# Patient Record
Sex: FEMALE | URBAN - METROPOLITAN AREA
[De-identification: names, ages, dates, MRNs, and addresses within clinical notes are randomized per-mention and may not be internally consistent; named-entity substitution may affect disease eponyms.]

---

## 2021-01-11 ENCOUNTER — OFFICE VISIT (OUTPATIENT)
Dept: URBAN - METROPOLITAN AREA CLINIC 37 | Facility: CLINIC | Age: 51
End: 2021-01-11

## 2021-01-11 ENCOUNTER — LAB OUTSIDE AN ENCOUNTER (OUTPATIENT)
Dept: URBAN - METROPOLITAN AREA CLINIC 37 | Facility: CLINIC | Age: 51
End: 2021-01-11

## 2021-01-11 VITALS — HEIGHT: 67 IN | BODY MASS INDEX: 25.11 KG/M2 | WEIGHT: 160 LBS

## 2021-01-11 PROBLEM — 16331000: Status: ACTIVE | Noted: 2021-01-11

## 2021-01-11 PROBLEM — 235595009: Status: ACTIVE | Noted: 2021-01-11

## 2021-01-11 RX ORDER — TURMERIC/TURMERIC ROOT EXTRACT 450MG-50MG
AS DIRECTED CAPSULE ORAL
Status: ACTIVE | COMMUNITY

## 2021-01-11 RX ORDER — SACCHAROMYCES BOULARDII 250 MG
1 CAPSULE CAPSULE ORAL TWICE A DAY
Qty: 60 | Status: ACTIVE | COMMUNITY

## 2021-01-11 NOTE — HPI-MIGRATED HPI
Interim investigations : Labs done on: ->  * 12/14/2020, ordered by PCP: - CMP: Glu: 110 (H); BUN: 16; Cr: 0.77; Na: 141; K: 4.2; Alb: 4.5; ALT: 10; AST: 19; Tbili: 0.5 - CBC: WBC: 8.7; RBC: 4.56; Hgb: 12.0; Hct: 37.2; Plt: 398 - Fe, total: 65; Ferritin: 7 (L);   Initial consultation : Patient is here for -> Abdominal bloating Onset of symptoms: since childhood, on and off. Worsen in the past  10 years She has  limitted on dairy products which helped a bit She noticed that she has been worsen with gluten food as well Characteristics: burning, gas  She tried food elimination, probiotics She had been tested for Celiac disease by PCP and it was negative but she stated she had been on gluten free for so long during that test She was treated for IBS  but did not feel better at all She will be seen OB/GYN later this week to r/o pathology  She is here today to find out what is the cause of her long term symptoms Current BM patterns: BM daily but usually QOD Medications tried: none, in the past she tried Prevacid  Tests/evaluations done previously: Denies prior EGD/Colon;

## 2021-01-27 ENCOUNTER — TELEPHONE ENCOUNTER (OUTPATIENT)
Dept: URBAN - METROPOLITAN AREA CLINIC 35 | Facility: CLINIC | Age: 51
End: 2021-01-27

## 2021-02-05 ENCOUNTER — OFFICE VISIT (OUTPATIENT)
Dept: URBAN - METROPOLITAN AREA SURGERY CENTER 8 | Facility: SURGERY CENTER | Age: 51
End: 2021-02-05

## 2021-02-05 ENCOUNTER — TELEPHONE ENCOUNTER (OUTPATIENT)
Dept: URBAN - METROPOLITAN AREA CLINIC 35 | Facility: CLINIC | Age: 51
End: 2021-02-05

## 2021-02-05 RX ORDER — SACCHAROMYCES BOULARDII 250 MG
1 CAPSULE CAPSULE ORAL TWICE A DAY
Qty: 60 | Status: ACTIVE | COMMUNITY

## 2021-02-05 RX ORDER — TURMERIC/TURMERIC ROOT EXTRACT 450MG-50MG
AS DIRECTED CAPSULE ORAL
Status: ACTIVE | COMMUNITY

## 2021-02-05 RX ORDER — OMEPRAZOLE 40 MG/1
1 CAPSULE CAPSULE, DELAYED RELEASE ORAL
Qty: 30 | Refills: 2 | OUTPATIENT
Start: 2021-02-05

## 2021-02-19 ENCOUNTER — LAB OUTSIDE AN ENCOUNTER (OUTPATIENT)
Dept: URBAN - METROPOLITAN AREA CLINIC 35 | Facility: CLINIC | Age: 51
End: 2021-02-19

## 2021-02-19 ENCOUNTER — OFFICE VISIT (OUTPATIENT)
Dept: URBAN - METROPOLITAN AREA CLINIC 35 | Facility: CLINIC | Age: 51
End: 2021-02-19

## 2021-02-19 VITALS — HEIGHT: 67 IN | WEIGHT: 161 LBS | BODY MASS INDEX: 25.27 KG/M2

## 2021-02-19 RX ORDER — TURMERIC/TURMERIC ROOT EXTRACT 450MG-50MG
AS DIRECTED CAPSULE ORAL
Status: ACTIVE | COMMUNITY

## 2021-02-19 RX ORDER — SACCHAROMYCES BOULARDII 250 MG
1 CAPSULE CAPSULE ORAL TWICE A DAY
Qty: 60 | Status: ACTIVE | COMMUNITY

## 2021-02-19 RX ORDER — SODIUM SULFATE, POTASSIUM SULFATE, MAGNESIUM SULFATE 17.5; 3.13; 1.6 G/ML; G/ML; G/ML
AS DIRECTED SOLUTION, CONCENTRATE ORAL ONCE
Qty: 1 KIT | Refills: 0 | OUTPATIENT
Start: 2021-02-19

## 2021-02-19 RX ORDER — OMEPRAZOLE 40 MG/1
1 CAPSULE CAPSULE, DELAYED RELEASE ORAL
Qty: 30 | Refills: 2 | Status: ACTIVE | COMMUNITY
Start: 2021-02-05

## 2021-02-19 NOTE — HPI-MIGRATED HPI
Post-op OV : Patient -> denies any new changes in his/her health status since last OV;   Post-op OV : After EGD on -> 02/05/2021, done due to heartburn and abdominal bloating. The lower third of the esophagus was normal with bxx showing no significant histopathology, negative for IM/hyper eosinophilia. Non-bleeding gastric ulcers with pigmented material was found in the gastric body with bxx showing no significant histopathology. No evidence of H. pylori. Normal duodenum with bxx showing normal villous pattern, negative for increased intraepithelial lymphocytes. After the procedure patient was advised to take Omeprazole 40 mg daily;   Post-op OV : Patient reports of current symptoms -> improved.  She continues taking Benefiber and Prilosec as advised;   Post-op OV : Patient denies -> rectal bleeding, fever, nausea & vomiting since the procedure date;

## 2021-03-03 ENCOUNTER — OFFICE VISIT (OUTPATIENT)
Dept: URBAN - METROPOLITAN AREA SURGERY CENTER 8 | Facility: SURGERY CENTER | Age: 51
End: 2021-03-03

## 2021-03-23 ENCOUNTER — DASHBOARD ENCOUNTERS (OUTPATIENT)
Age: 51
End: 2021-03-23

## 2021-03-23 ENCOUNTER — OFFICE VISIT (OUTPATIENT)
Dept: URBAN - METROPOLITAN AREA CLINIC 35 | Facility: CLINIC | Age: 51
End: 2021-03-23

## 2021-03-23 VITALS — HEIGHT: 67 IN

## 2021-03-23 PROBLEM — 116289008: Status: ACTIVE | Noted: 2021-01-11

## 2021-03-23 PROBLEM — 87522002: Status: ACTIVE | Noted: 2021-02-19

## 2021-03-23 PROBLEM — 305058001: Status: ACTIVE | Noted: 2021-02-19

## 2021-03-23 PROBLEM — 59913009 GASTRIC ULCER WITHOUT HEMORRHAGE, WITHOUT PERFORATION AND WITHOUT OBSTRUCTION: Status: ACTIVE | Noted: 2021-02-05

## 2021-03-23 PROBLEM — 440630006: Status: ACTIVE | Noted: 2021-02-19

## 2021-03-23 RX ORDER — SODIUM SULFATE, POTASSIUM SULFATE, MAGNESIUM SULFATE 17.5; 3.13; 1.6 G/ML; G/ML; G/ML
AS DIRECTED SOLUTION, CONCENTRATE ORAL ONCE
Qty: 1 KIT | Refills: 0 | Status: ON HOLD | COMMUNITY
Start: 2021-02-19

## 2021-03-23 RX ORDER — TURMERIC/TURMERIC ROOT EXTRACT 450MG-50MG
AS DIRECTED CAPSULE ORAL
Status: ACTIVE | COMMUNITY

## 2021-03-23 RX ORDER — DOCUSATE SODIUM 100 MG/1
2 CAPSULES CAPSULE ORAL ONCE A DAY
Qty: 60 CAPSULE | Status: ACTIVE | COMMUNITY

## 2021-03-23 RX ORDER — OMEPRAZOLE 40 MG/1
1 CAPSULE CAPSULE, DELAYED RELEASE ORAL
Qty: 30 | Refills: 2 | Status: ACTIVE | COMMUNITY
Start: 2021-02-05

## 2021-03-23 RX ORDER — SACCHAROMYCES BOULARDII 250 MG
1 CAPSULE CAPSULE ORAL TWICE A DAY
Qty: 60 | Status: ACTIVE | COMMUNITY

## 2021-03-23 NOTE — HPI-MIGRATED HPI
Post-op OV : Patient denies -> rectal bleeding, fever, nausea & vomiting since the procedure date;   Post-op OV : Patient -> denies any new changes in his/her health status since last OV.  Current BM: daily BM with Colace 200 mg daily + fiber supplementation;   Post-op OV : After EGD on -> ;   Post-op OV : After ERCP on -> ;   Post-op OV : Flexible sigmoidoscopy on -> ;   Post-op OV : After Small Bowel Enteroscopy -> ;   Post-op OV : After Colonoscopy on -> 03/03/2021, at which time two small polyps were detected and resected. Histology showed they were fragments for hyperplastic polyps. Non-bleeding grade II external and internal hemorrhoids were found during retroflexion but not banded. Good bowel prep;   Follow up OV : Patient is here for a routine OV for -> Gastric ulcer, IBS and Iron deficiency anemia;   Follow up OV : Last OV was -> 1 month ago Gastric ulcer:  EGD on 02/05/2021 revealed non-bleeding gastric ulcer.  Patient continued taking Omeprazole 40mg QD x 6 weeks then tapered dose.  Current sx: no symptoms  IBS:  Patient has been taking Colace 200-400mg + natural fiber  Current BM: daily  Iron defiecency anemia:  Patient is on Slow Fe BIW Plan to repeat CBC and iron panel after 3-6 months;